# Patient Record
(demographics unavailable — no encounter records)

---

## 2025-05-19 NOTE — PHYSICAL EXAM
[No Lymphadenopathy] : no lymphadenopathy [FreeTextEntry2] : Aleta [FreeTextEntry3] : No thyroid nodules

## 2025-05-19 NOTE — HISTORY OF PRESENT ILLNESS
[TextBox_4] :  LMP 3/20/25 EDC 25 who presents for confirmation of pregnancy.  2025 crown-rump length consistent with 10w 1d EDC 12/15/25  Will start taking a prenatal vitamins. No fatigue or nausea.  Ob Hx: 10/6/21 NVD of baby girl, 8 lbs, HVH/NYP, uneventful. 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 First trimester screen and NIPT discussed with patient

## 2025-05-19 NOTE — HISTORY OF PRESENT ILLNESS
[TextBox_4] :  LMP 3/20/25 EDC 25 who presents for confirmation of pregnancy.  2025 crown-rump length consistent with 10w 1d EDC 12/15/25  Will start taking a prenatal vitamins. No fatigue or nausea.  Ob Hx: 10/6/21 NVD of baby girl, 8 lbs, HVH/NYP, uneventful. [facility]. First trimester screen and NIPT discussed with patient